# Patient Record
Sex: FEMALE | Race: AMERICAN INDIAN OR ALASKA NATIVE
[De-identification: names, ages, dates, MRNs, and addresses within clinical notes are randomized per-mention and may not be internally consistent; named-entity substitution may affect disease eponyms.]

---

## 2019-09-30 ENCOUNTER — HOSPITAL ENCOUNTER (EMERGENCY)
Dept: HOSPITAL 5 - ED | Age: 29
LOS: 1 days | Discharge: HOME | End: 2019-10-01
Payer: MEDICAID

## 2019-09-30 DIAGNOSIS — N39.0: ICD-10-CM

## 2019-09-30 DIAGNOSIS — Z79.899: ICD-10-CM

## 2019-09-30 DIAGNOSIS — N61.0: Primary | ICD-10-CM

## 2019-09-30 DIAGNOSIS — Z79.1: ICD-10-CM

## 2019-09-30 LAB
ALBUMIN SERPL-MCNC: 4.4 G/DL (ref 3.9–5)
ALT SERPL-CCNC: 22 UNITS/L (ref 7–56)
BASOPHILS # (AUTO): 0.1 K/MM3 (ref 0–0.1)
BASOPHILS NFR BLD AUTO: 0.6 % (ref 0–1.8)
BILIRUB UR QL STRIP: (no result)
BLOOD UR QL VISUAL: (no result)
BUN SERPL-MCNC: 11 MG/DL (ref 7–17)
BUN/CREAT SERPL: 16 %
CALCIUM SERPL-MCNC: 9.4 MG/DL (ref 8.4–10.2)
EOSINOPHIL # BLD AUTO: 0.1 K/MM3 (ref 0–0.4)
EOSINOPHIL NFR BLD AUTO: 1 % (ref 0–4.3)
HCT VFR BLD CALC: 39.7 % (ref 30.3–42.9)
HEMOLYSIS INDEX: 6
HGB BLD-MCNC: 12.7 GM/DL (ref 10.1–14.3)
LYMPHOCYTES # BLD AUTO: 0.9 K/MM3 (ref 1.2–5.4)
LYMPHOCYTES NFR BLD AUTO: 10.4 % (ref 13.4–35)
MCHC RBC AUTO-ENTMCNC: 32 % (ref 30–34)
MCV RBC AUTO: 69 FL (ref 79–97)
MONOCYTES # (AUTO): 1.3 K/MM3 (ref 0–0.8)
MONOCYTES % (AUTO): 14.5 % (ref 0–7.3)
MUCOUS THREADS #/AREA URNS HPF: (no result) /HPF
PH UR STRIP: 7 [PH] (ref 5–7)
PLATELET # BLD: 244 K/MM3 (ref 140–440)
PROT UR STRIP-MCNC: (no result) MG/DL
RBC # BLD AUTO: 5.75 M/MM3 (ref 3.65–5.03)
RBC #/AREA URNS HPF: 3 /HPF (ref 0–6)
UROBILINOGEN UR-MCNC: < 2 MG/DL (ref ?–2)
WBC #/AREA URNS HPF: 3 /HPF (ref 0–6)

## 2019-09-30 PROCEDURE — 99284 EMERGENCY DEPT VISIT MOD MDM: CPT

## 2019-09-30 PROCEDURE — 82140 ASSAY OF AMMONIA: CPT

## 2019-09-30 PROCEDURE — 36415 COLL VENOUS BLD VENIPUNCTURE: CPT

## 2019-09-30 PROCEDURE — 80053 COMPREHEN METABOLIC PANEL: CPT

## 2019-09-30 PROCEDURE — 81001 URINALYSIS AUTO W/SCOPE: CPT

## 2019-09-30 PROCEDURE — 96365 THER/PROPH/DIAG IV INF INIT: CPT

## 2019-09-30 PROCEDURE — 96375 TX/PRO/DX INJ NEW DRUG ADDON: CPT

## 2019-09-30 PROCEDURE — 87086 URINE CULTURE/COLONY COUNT: CPT

## 2019-09-30 PROCEDURE — 85025 COMPLETE CBC W/AUTO DIFF WBC: CPT

## 2019-09-30 NOTE — EVENT NOTE
ED Screening Note


Date of service: 09/30/19


Time: 21:23


ED Screening Note: 





This is a 29 y.o. F. that presents to the ER with swelling and pain to bilateral

breast.





Patient is currently breast feeding and recently diagnosed with mastitis right 

breast 08/31/2019.





She completed keflex at the beginning of this month.





This initial assessment/diagnostic orders/clinical plan/treatment(s) is/are 

subject to change based on patients health status, clinical progression and re-

assessment by fellow clinical providers in the ED. Further treatment and workup 

at subsequent clinical providers discretion. Patient/guardian urged not to elope

from the ED as their condition may be serious if not clinically assessed and 

managed. 





Initial orders include: 





Labs

## 2019-10-01 VITALS — DIASTOLIC BLOOD PRESSURE: 69 MMHG | SYSTOLIC BLOOD PRESSURE: 110 MMHG

## 2019-10-01 NOTE — EMERGENCY DEPARTMENT REPORT
ED General Adult HPI





- General


Chief complaint: Fever


Stated complaint: FEVER CHILLS BREAST PAIN


Time Seen by Provider: 09/30/19 21:22


Source: patient


Mode of arrival: Ambulatory


Limitations: No Limitations





- History of Present Illness


Initial comments: 





Patient is a 29-year-old  female who is 6 months postpartum who is 

complaining of fevers and chills and breast pain.  Patient states that 

approximately 2 months ago she had mastitis she took her antibiotics and had 

complete resolution of symptoms.  Patient states that now she has some tenderne

ss to the bilateral breasts at approximately 12:00 on both.  Patient states this

started earlier today.  Patient denies any purulent or bloody discharge from the

nipple.  She states there is no redness to the breast but she does have some 

warmth and tenderness on palpation.  Says these symptoms patient has some lower 

back discomfort but denies dysuria nausea vomiting diarrhea cough cold or 

congestion.


Severity scale (0 -10): 0





- Related Data


                                  Previous Rx's











 Medication  Instructions  Recorded  Last Taken  Type


 


Ferrous Sulfate [Feosol 325 MG tab] 325 mg PO QDAY #20 tablet 04/14/16 Unknown 

Rx


 


Ibuprofen [Motrin] 800 mg PO Q8HR PRN #30 tablet 04/14/16 Unknown Rx


 


traMADol [Ultram 50 MG tab] 50 mg PO Q6HR PRN #20 tablet 04/14/16 Unknown Rx


 


Nitrofurantoin Mono/M-Cryst 100 mg PO Q12HR #14 capsule 11/05/16 Unknown Rx





[Macrobid CAP]    


 


Nystatin Oint [Mycostatin Oint] 1 applicatio TP TID #1 tube 11/05/16 Unknown Rx


 


Amoxicillin [Amoxicillin TAB] 875 mg PO BID #7 tablet 12/21/16 Unknown Rx


 


Fluticasone [Flonase] 1 spray NS QDAY #1 bottle 12/21/16 Unknown Rx


 


guaiFENesin [Mucinex] 600 mg PO Q12H #10 tab 12/21/16 Unknown Rx


 


Clindamycin [Clindamycin CAP] 300 mg PO Q8H #21 cap 10/01/19 Unknown Rx


 


Ibuprofen [Motrin 800 MG tab] 800 mg PO Q8HR PRN #10 tablet 10/01/19 Unknown Rx


 


Nitrofurantoin Mono/M-Cryst 100 mg PO Q12HR #14 capsule 10/01/19 Unknown Rx





[Macrobid CAP]    


 


Ondansetron [Zofran Odt] 4 mg PO Q8HR #10 tab.rapdis 10/01/19 Unknown Rx


 


oxyCODONE /ACETAMINOPHEN [Percocet 1 tab PO Q4HR #10 tab 10/01/19 Unknown Rx





5/325]    











                                    Allergies











Allergy/AdvReac Type Severity Reaction Status Date / Time


 


No Known Allergies Allergy   Verified 11/05/16 08:17














ED Review of Systems


ROS: 


Stated complaint: FEVER CHILLS BREAST PAIN


Other details as noted in HPI





Comment: All other systems reviewed and negative





ED Past Medical Hx





- Past Medical History


Previous Medical History?: Yes


Hx Hypertension: No


Hx Diabetes: No


Hx Deep Vein Thrombosis: No


Hx Renal Disease: No


Hx Sickle Cell Disease: No


Hx Seizures: No


Hx Asthma: No


Hx HIV: No


Additional medical history: vaginal delivery x 2





- Surgical History


Past Surgical History?: No





- Social History


Smoking Status: Never Smoker


Substance Use Type: None





- Medications


Home Medications: 


                                Home Medications











 Medication  Instructions  Recorded  Confirmed  Last Taken  Type


 


Ferrous Sulfate [Feosol 325 MG tab] 325 mg PO QDAY #20 tablet 04/14/16 08/04/16 

Unknown Rx


 


Ibuprofen [Motrin] 800 mg PO Q8HR PRN #30 tablet 04/14/16 08/04/16 Unknown Rx


 


traMADol [Ultram 50 MG tab] 50 mg PO Q6HR PRN #20 tablet 04/14/16 08/04/16 

Unknown Rx


 


Nitrofurantoin Mono/M-Cryst 100 mg PO Q12HR #14 capsule 11/05/16  Unknown Rx





[Macrobid CAP]     


 


Nystatin Oint [Mycostatin Oint] 1 applicatio TP TID #1 tube 11/05/16  Unknown Rx


 


Amoxicillin [Amoxicillin TAB] 875 mg PO BID #7 tablet 12/21/16  Unknown Rx


 


Fluticasone [Flonase] 1 spray NS QDAY #1 bottle 12/21/16  Unknown Rx


 


guaiFENesin [Mucinex] 600 mg PO Q12H #10 tab 12/21/16  Unknown Rx


 


Clindamycin [Clindamycin CAP] 300 mg PO Q8H #21 cap 10/01/19  Unknown Rx


 


Ibuprofen [Motrin 800 MG tab] 800 mg PO Q8HR PRN #10 tablet 10/01/19  Unknown Rx


 


Nitrofurantoin Mono/M-Cryst 100 mg PO Q12HR #14 capsule 10/01/19  Unknown Rx





[Macrobid CAP]     


 


Ondansetron [Zofran Odt] 4 mg PO Q8HR #10 tab.rapdis 10/01/19  Unknown Rx


 


oxyCODONE /ACETAMINOPHEN [Percocet 1 tab PO Q4HR #10 tab 10/01/19  Unknown Rx





5/325]     














ED Physical Exam





- General


Limitations: No Limitations


General appearance: alert, in no apparent distress





- Head


Head exam: Present: atraumatic, normocephalic





- Eye


Eye exam: Present: normal appearance.  Absent: PERRL, EOMI





- ENT


ENT exam: Present: mucous membranes moist





- Neck


Neck exam: Present: normal inspection





- Respiratory


Respiratory exam: Present: normal lung sounds bilaterally, chest wall tenderness

(tenderness to bilateral breast superiorly).  Absent: respiratory distress, 

wheezes, rales, rhonchi





- Cardiovascular


Cardiovascular Exam: Present: normal rhythm, tachycardia.  Absent: normal heart 

sounds, systolic murmur, diastolic murmur, rubs, gallop





- GI/Abdominal


GI/Abdominal exam: Present: soft, normal bowel sounds.  Absent: distended, 

tenderness, guarding, rebound





- Extremities Exam


Extremities exam: Present: normal inspection





- Back Exam


Back exam: Present: normal inspection





- Neurological Exam


Neurological exam: Present: alert, oriented X3





- Psychiatric


Psychiatric exam: Present: normal affect, normal mood





- Skin


Skin exam: Present: warm, dry, intact, normal color.  Absent: rash





ED Course





                                   Vital Signs











  09/30/19 09/30/19 09/30/19





  21:13 21:23 21:32


 


Temperature 103.0 F H 103.0 F H 


 


Pulse Rate 120 H 120 H 


 


Respiratory 20 20 18





Rate   


 


Blood Pressure  108/82 


 


Blood Pressure 108/82  





[Right]   


 


O2 Sat by Pulse 100 100 





Oximetry   














  09/30/19 09/30/19





  22:31 22:32


 


Temperature 99.4 F 


 


Pulse Rate 91 H 


 


Respiratory 16 16





Rate  


 


Blood Pressure  


 


Blood Pressure 112/68 





[Right]  


 


O2 Sat by Pulse 100 





Oximetry  














ED Medical Decision Making





- Lab Data


Result diagrams: 


                                 09/30/19 21:39





                                 09/30/19 21:39








                                   Lab Results











  09/30/19 09/30/19 09/30/19 Range/Units





  21:39 21:39 21:39 


 


WBC  8.7    (4.5-11.0)  K/mm3


 


RBC  5.75 H    (3.65-5.03)  M/mm3


 


Hgb  12.7    (10.1-14.3)  gm/dl


 


Hct  39.7    (30.3-42.9)  %


 


MCV  69 L    (79-97)  fl


 


MCH  22 L    (28-32)  pg


 


MCHC  32    (30-34)  %


 


RDW  15.6 H    (13.2-15.2)  %


 


Plt Count  244    (140-440)  K/mm3


 


Lymph % (Auto)  10.4 L    (13.4-35.0)  %


 


Mono % (Auto)  14.5 H    (0.0-7.3)  %


 


Eos % (Auto)  1.0    (0.0-4.3)  %


 


Baso % (Auto)  0.6    (0.0-1.8)  %


 


Lymph #  0.9 L    (1.2-5.4)  K/mm3


 


Mono #  1.3 H    (0.0-0.8)  K/mm3


 


Eos #  0.1    (0.0-0.4)  K/mm3


 


Baso #  0.1    (0.0-0.1)  K/mm3


 


Seg Neutrophils %  73.5 H    (40.0-70.0)  %


 


Seg Neutrophils #  6.4    (1.8-7.7)  K/mm3


 


Sodium   133 L   (137-145)  mmol/L


 


Potassium   3.6   (3.6-5.0)  mmol/L


 


Chloride   98.4   ()  mmol/L


 


Carbon Dioxide   20 L   (22-30)  mmol/L


 


Anion Gap   18   mmol/L


 


BUN   11   (7-17)  mg/dL


 


Creatinine   0.7   (0.7-1.2)  mg/dL


 


Estimated GFR   > 60   ml/min


 


BUN/Creatinine Ratio   16   %


 


Glucose   79   ()  mg/dL


 


Lactic Acid    1.10  (0.7-2.0)  mmol/L


 


Calcium   9.4   (8.4-10.2)  mg/dL


 


Total Bilirubin   0.40   (0.1-1.2)  mg/dL


 


AST   28   (5-40)  units/L


 


ALT   22   (7-56)  units/L


 


Alkaline Phosphatase   70   ()  units/L


 


Total Protein   8.3 H   (6.3-8.2)  g/dL


 


Albumin   4.4   (3.9-5)  g/dL


 


Albumin/Globulin Ratio   1.1   %


 


Urine Color     (Yellow)  


 


Urine Turbidity     (Clear)  


 


Urine pH     (5.0-7.0)  


 


Ur Specific Gravity     (1.003-1.030)  


 


Urine Protein     (Negative)  mg/dL


 


Urine Glucose (UA)     (Negative)  mg/dL


 


Urine Ketones     (Negative)  mg/dL


 


Urine Blood     (Negative)  


 


Urine Nitrite     (Negative)  


 


Urine Bilirubin     (Negative)  


 


Urine Urobilinogen     (<2.0)  mg/dL


 


Ur Leukocyte Esterase     (Negative)  


 


Urine WBC (Auto)     (0.0-6.0)  /HPF


 


Urine RBC (Auto)     (0.0-6.0)  /HPF


 


U Epithel Cells (Auto)     (0-13.0)  /HPF


 


Urine Mucus     /HPF














  09/30/19 Range/Units





  22:44 


 


WBC   (4.5-11.0)  K/mm3


 


RBC   (3.65-5.03)  M/mm3


 


Hgb   (10.1-14.3)  gm/dl


 


Hct   (30.3-42.9)  %


 


MCV   (79-97)  fl


 


MCH   (28-32)  pg


 


MCHC   (30-34)  %


 


RDW   (13.2-15.2)  %


 


Plt Count   (140-440)  K/mm3


 


Lymph % (Auto)   (13.4-35.0)  %


 


Mono % (Auto)   (0.0-7.3)  %


 


Eos % (Auto)   (0.0-4.3)  %


 


Baso % (Auto)   (0.0-1.8)  %


 


Lymph #   (1.2-5.4)  K/mm3


 


Mono #   (0.0-0.8)  K/mm3


 


Eos #   (0.0-0.4)  K/mm3


 


Baso #   (0.0-0.1)  K/mm3


 


Seg Neutrophils %   (40.0-70.0)  %


 


Seg Neutrophils #   (1.8-7.7)  K/mm3


 


Sodium   (137-145)  mmol/L


 


Potassium   (3.6-5.0)  mmol/L


 


Chloride   ()  mmol/L


 


Carbon Dioxide   (22-30)  mmol/L


 


Anion Gap   mmol/L


 


BUN   (7-17)  mg/dL


 


Creatinine   (0.7-1.2)  mg/dL


 


Estimated GFR   ml/min


 


BUN/Creatinine Ratio   %


 


Glucose   ()  mg/dL


 


Lactic Acid   (0.7-2.0)  mmol/L


 


Calcium   (8.4-10.2)  mg/dL


 


Total Bilirubin   (0.1-1.2)  mg/dL


 


AST   (5-40)  units/L


 


ALT   (7-56)  units/L


 


Alkaline Phosphatase   ()  units/L


 


Total Protein   (6.3-8.2)  g/dL


 


Albumin   (3.9-5)  g/dL


 


Albumin/Globulin Ratio   %


 


Urine Color  Yellow  (Yellow)  


 


Urine Turbidity  Slightly-cloudy  (Clear)  


 


Urine pH  7.0  (5.0-7.0)  


 


Ur Specific Gravity  1.024  (1.003-1.030)  


 


Urine Protein  <15 mg/dl  (Negative)  mg/dL


 


Urine Glucose (UA)  Neg  (Negative)  mg/dL


 


Urine Ketones  20  (Negative)  mg/dL


 


Urine Blood  Neg  (Negative)  


 


Urine Nitrite  Neg  (Negative)  


 


Urine Bilirubin  Neg  (Negative)  


 


Urine Urobilinogen  < 2.0  (<2.0)  mg/dL


 


Ur Leukocyte Esterase  Lg  (Negative)  


 


Urine WBC (Auto)  3.0  (0.0-6.0)  /HPF


 


Urine RBC (Auto)  3.0  (0.0-6.0)  /HPF


 


U Epithel Cells (Auto)  5.0  (0-13.0)  /HPF


 


Urine Mucus  Few  /HPF














- Medical Decision Making





Despite the patient's high fever the patient's breast exam does not show any ev

idence of advanced mastitis.  She does have some tenderness on palpation but no 

erythema induration of the skin nor fluctuance.  Patient be started on 

clindamycin.  Patient also does have evidence of early UTI she'll be started on 

Macrobid for this.  Patient will be discharged home with follow-up with her 

primary care.


Critical care attestation.: 


If time is entered above; I have spent that time in minutes in the direct care 

of this critically ill patient, excluding procedure time.








ED Disposition


Clinical Impression: 


 Mastitis





UTI (urinary tract infection)


Qualifiers:


 Urinary tract infection type: acute cystitis Hematuria presence: without 

hematuria Qualified Code(s): N30.00 - Acute cystitis without hematuria





Disposition: DC-01 TO HOME OR SELFCARE


Is pt being admited?: No


Does the pt Need Aspirin: No


Condition: Stable


Instructions:  Mastitis (ED), Urinary Tract Infection in Women (ED)


Additional Instructions: 


Please follow with your OB/GYN in the next week


Time of Disposition: 00:08